# Patient Record
Sex: FEMALE | Race: BLACK OR AFRICAN AMERICAN | NOT HISPANIC OR LATINO | Employment: UNEMPLOYED | URBAN - METROPOLITAN AREA
[De-identification: names, ages, dates, MRNs, and addresses within clinical notes are randomized per-mention and may not be internally consistent; named-entity substitution may affect disease eponyms.]

---

## 2021-05-10 ENCOUNTER — OFFICE VISIT (OUTPATIENT)
Dept: OPHTHALMOLOGY | Facility: CLINIC | Age: 66
End: 2021-05-10
Payer: MEDICAID

## 2021-05-10 DIAGNOSIS — E11.9 TYPE 2 DIABETES MELLITUS WITHOUT RETINOPATHY: Primary | ICD-10-CM

## 2021-05-10 DIAGNOSIS — H40.013 OPEN ANGLE WITH BORDERLINE FINDINGS OF BOTH EYES: ICD-10-CM

## 2021-05-10 DIAGNOSIS — H25.011 CORTICAL AGE-RELATED CATARACT OF RIGHT EYE: ICD-10-CM

## 2021-05-10 DIAGNOSIS — H52.13 MYOPIA, BILATERAL: ICD-10-CM

## 2021-05-10 DIAGNOSIS — H25.13 NUCLEAR SCLEROSIS, BILATERAL: ICD-10-CM

## 2021-05-10 DIAGNOSIS — H52.222 REGULAR ASTIGMATISM OF LEFT EYE: ICD-10-CM

## 2021-05-10 DIAGNOSIS — H52.4 PRESBYOPIA: ICD-10-CM

## 2021-05-10 PROCEDURE — 92004 PR EYE EXAM, NEW PATIENT,COMPREHESV: ICD-10-PCS | Mod: S$PBB,,, | Performed by: OPTOMETRIST

## 2021-05-10 PROCEDURE — 92015 PR REFRACTION: ICD-10-PCS | Mod: ,,, | Performed by: OPTOMETRIST

## 2021-05-10 PROCEDURE — 92133 POSTERIOR SEGMENT OCT OPTIC NERVE(OCULAR COHERENCE TOMOGRAPHY) - OU - BOTH EYES: ICD-10-PCS | Mod: 26,S$PBB,, | Performed by: OPTOMETRIST

## 2021-05-10 PROCEDURE — 92133 CPTRZD OPH DX IMG PST SGM ON: CPT | Mod: PBBFAC | Performed by: OPTOMETRIST

## 2021-05-10 PROCEDURE — 99999 PR PBB SHADOW E&M-NEW PATIENT-LVL II: CPT | Mod: PBBFAC,,, | Performed by: OPTOMETRIST

## 2021-05-10 PROCEDURE — 99202 OFFICE O/P NEW SF 15 MIN: CPT | Mod: PBBFAC,25 | Performed by: OPTOMETRIST

## 2021-05-10 PROCEDURE — 92004 COMPRE OPH EXAM NEW PT 1/>: CPT | Mod: S$PBB,,, | Performed by: OPTOMETRIST

## 2021-05-10 PROCEDURE — 99999 PR PBB SHADOW E&M-NEW PATIENT-LVL II: ICD-10-PCS | Mod: PBBFAC,,, | Performed by: OPTOMETRIST

## 2021-05-10 PROCEDURE — 92015 DETERMINE REFRACTIVE STATE: CPT | Mod: ,,, | Performed by: OPTOMETRIST

## 2021-05-10 RX ORDER — METFORMIN HYDROCHLORIDE 500 MG/1
500 TABLET, EXTENDED RELEASE ORAL 2 TIMES DAILY
COMMUNITY
Start: 2021-03-26

## 2021-05-10 RX ORDER — LORATADINE 10 MG/1
10 TABLET ORAL DAILY
COMMUNITY

## 2021-05-10 RX ORDER — FLUTICASONE PROPIONATE 50 MCG
1 SPRAY, SUSPENSION (ML) NASAL DAILY
COMMUNITY
Start: 2021-05-01

## 2021-05-10 RX ORDER — AMLODIPINE BESYLATE 5 MG/1
5 TABLET ORAL DAILY
COMMUNITY
Start: 2021-03-03

## 2021-05-10 RX ORDER — LETROZOLE 2.5 MG/1
2.5 TABLET, FILM COATED ORAL DAILY
COMMUNITY
Start: 2021-04-30

## 2021-05-10 RX ORDER — MELOXICAM 15 MG/1
15 TABLET ORAL DAILY
COMMUNITY
Start: 2021-04-07

## 2021-08-13 ENCOUNTER — APPOINTMENT (OUTPATIENT)
Dept: OPHTHALMOLOGY | Facility: CLINIC | Age: 66
End: 2021-08-13
Payer: MEDICAID

## 2021-08-13 ENCOUNTER — OFFICE VISIT (OUTPATIENT)
Dept: OPHTHALMOLOGY | Facility: CLINIC | Age: 66
End: 2021-08-13
Payer: MEDICARE

## 2021-08-13 DIAGNOSIS — H40.013 OPEN ANGLE WITH BORDERLINE FINDINGS OF BOTH EYES: Primary | ICD-10-CM

## 2021-08-13 PROCEDURE — 92012 INTRM OPH EXAM EST PATIENT: CPT | Mod: S$PBB,,, | Performed by: OPTOMETRIST

## 2021-08-13 PROCEDURE — 76514 ECHO EXAM OF EYE THICKNESS: CPT | Mod: 26,S$PBB,, | Performed by: OPTOMETRIST

## 2021-08-13 PROCEDURE — 92083 HUMPHREY VISUAL FIELD - OU - BOTH EYES: ICD-10-PCS | Mod: 26,S$PBB,, | Performed by: OPTOMETRIST

## 2021-08-13 PROCEDURE — 76514 ECHO EXAM OF EYE THICKNESS: CPT | Mod: PBBFAC | Performed by: OPTOMETRIST

## 2021-08-13 PROCEDURE — 76514 PR  US, EYE, FOR CORNEAL THICKNESS: ICD-10-PCS | Mod: 26,S$PBB,, | Performed by: OPTOMETRIST

## 2021-08-13 PROCEDURE — 99999 PR PBB SHADOW E&M-EST. PATIENT-LVL II: ICD-10-PCS | Mod: PBBFAC,,, | Performed by: OPTOMETRIST

## 2021-08-13 PROCEDURE — 92012 PR EYE EXAM, EST PATIENT,INTERMED: ICD-10-PCS | Mod: S$PBB,,, | Performed by: OPTOMETRIST

## 2021-08-13 PROCEDURE — 99212 OFFICE O/P EST SF 10 MIN: CPT | Mod: PBBFAC | Performed by: OPTOMETRIST

## 2021-08-13 PROCEDURE — 99999 PR PBB SHADOW E&M-EST. PATIENT-LVL II: CPT | Mod: PBBFAC,,, | Performed by: OPTOMETRIST

## 2021-08-13 PROCEDURE — 92083 EXTENDED VISUAL FIELD XM: CPT | Mod: PBBFAC | Performed by: OPTOMETRIST

## 2021-12-14 ENCOUNTER — OFFICE VISIT (OUTPATIENT)
Dept: OPHTHALMOLOGY | Facility: CLINIC | Age: 66
End: 2021-12-14
Payer: MEDICARE

## 2021-12-14 DIAGNOSIS — H40.013 OPEN ANGLE WITH BORDERLINE FINDINGS OF BOTH EYES: Primary | ICD-10-CM

## 2021-12-14 PROCEDURE — 99999 PR PBB SHADOW E&M-EST. PATIENT-LVL II: ICD-10-PCS | Mod: PBBFAC,,, | Performed by: OPTOMETRIST

## 2021-12-14 PROCEDURE — 99212 OFFICE O/P EST SF 10 MIN: CPT | Mod: PBBFAC | Performed by: OPTOMETRIST

## 2021-12-14 PROCEDURE — 99213 OFFICE O/P EST LOW 20 MIN: CPT | Mod: S$GLB,,, | Performed by: OPTOMETRIST

## 2021-12-14 PROCEDURE — 99999 PR PBB SHADOW E&M-EST. PATIENT-LVL II: CPT | Mod: PBBFAC,,, | Performed by: OPTOMETRIST

## 2021-12-14 PROCEDURE — 4010F ACE/ARB THERAPY RXD/TAKEN: CPT | Mod: CPTII,S$GLB,, | Performed by: OPTOMETRIST

## 2021-12-14 PROCEDURE — 99213 PR OFFICE/OUTPT VISIT, EST, LEVL III, 20-29 MIN: ICD-10-PCS | Mod: S$GLB,,, | Performed by: OPTOMETRIST

## 2021-12-14 PROCEDURE — 4010F PR ACE/ARB THEARPY RXD/TAKEN: ICD-10-PCS | Mod: CPTII,S$GLB,, | Performed by: OPTOMETRIST

## 2021-12-27 ENCOUNTER — TELEPHONE (OUTPATIENT)
Dept: OPHTHALMOLOGY | Facility: CLINIC | Age: 66
End: 2021-12-27
Payer: MEDICARE

## 2022-03-31 ENCOUNTER — OFFICE VISIT (OUTPATIENT)
Dept: OPHTHALMOLOGY | Facility: CLINIC | Age: 67
End: 2022-03-31
Payer: MEDICARE

## 2022-03-31 DIAGNOSIS — H25.011 CORTICAL AGE-RELATED CATARACT OF RIGHT EYE: ICD-10-CM

## 2022-03-31 DIAGNOSIS — E11.36 DIABETIC CATARACT OF BOTH EYES: ICD-10-CM

## 2022-03-31 DIAGNOSIS — E11.9 TYPE 2 DIABETES MELLITUS WITHOUT RETINOPATHY: Primary | ICD-10-CM

## 2022-03-31 DIAGNOSIS — H52.222 REGULAR ASTIGMATISM OF LEFT EYE: ICD-10-CM

## 2022-03-31 DIAGNOSIS — H40.013 OPEN ANGLE WITH BORDERLINE FINDINGS OF BOTH EYES: ICD-10-CM

## 2022-03-31 DIAGNOSIS — H52.13 MYOPIA WITH PRESBYOPIA, BILATERAL: ICD-10-CM

## 2022-03-31 DIAGNOSIS — H25.13 NUCLEAR SCLEROSIS, BILATERAL: ICD-10-CM

## 2022-03-31 DIAGNOSIS — H52.4 MYOPIA WITH PRESBYOPIA, BILATERAL: ICD-10-CM

## 2022-03-31 PROCEDURE — 92014 PR EYE EXAM, EST PATIENT,COMPREHESV: ICD-10-PCS | Mod: S$GLB,,, | Performed by: OPTOMETRIST

## 2022-03-31 PROCEDURE — 92015 DETERMINE REFRACTIVE STATE: CPT | Mod: S$GLB,,, | Performed by: OPTOMETRIST

## 2022-03-31 PROCEDURE — 1160F PR REVIEW ALL MEDS BY PRESCRIBER/CLIN PHARMACIST DOCUMENTED: ICD-10-PCS | Mod: CPTII,S$GLB,, | Performed by: OPTOMETRIST

## 2022-03-31 PROCEDURE — 1160F RVW MEDS BY RX/DR IN RCRD: CPT | Mod: CPTII,S$GLB,, | Performed by: OPTOMETRIST

## 2022-03-31 PROCEDURE — 99211 OFF/OP EST MAY X REQ PHY/QHP: CPT | Mod: PBBFAC | Performed by: OPTOMETRIST

## 2022-03-31 PROCEDURE — 92014 COMPRE OPH EXAM EST PT 1/>: CPT | Mod: S$GLB,,, | Performed by: OPTOMETRIST

## 2022-03-31 PROCEDURE — 2023F DILAT RTA XM W/O RTNOPTHY: CPT | Mod: CPTII,S$GLB,, | Performed by: OPTOMETRIST

## 2022-03-31 PROCEDURE — 92133 POSTERIOR SEGMENT OCT OPTIC NERVE(OCULAR COHERENCE TOMOGRAPHY) - OU - BOTH EYES: ICD-10-PCS | Mod: S$GLB,,, | Performed by: OPTOMETRIST

## 2022-03-31 PROCEDURE — 1159F MED LIST DOCD IN RCRD: CPT | Mod: CPTII,S$GLB,, | Performed by: OPTOMETRIST

## 2022-03-31 PROCEDURE — 99999 PR PBB SHADOW E&M-EST. PATIENT-LVL I: CPT | Mod: PBBFAC,,, | Performed by: OPTOMETRIST

## 2022-03-31 PROCEDURE — 92133 CPTRZD OPH DX IMG PST SGM ON: CPT | Mod: PBBFAC | Performed by: OPTOMETRIST

## 2022-03-31 PROCEDURE — 1159F PR MEDICATION LIST DOCUMENTED IN MEDICAL RECORD: ICD-10-PCS | Mod: CPTII,S$GLB,, | Performed by: OPTOMETRIST

## 2022-03-31 PROCEDURE — 2023F PR DILATED RETINAL EXAM W/O EVID OF RETINOPATHY: ICD-10-PCS | Mod: CPTII,S$GLB,, | Performed by: OPTOMETRIST

## 2022-03-31 PROCEDURE — 92015 PR REFRACTION: ICD-10-PCS | Mod: S$GLB,,, | Performed by: OPTOMETRIST

## 2022-03-31 PROCEDURE — 99999 PR PBB SHADOW E&M-EST. PATIENT-LVL I: ICD-10-PCS | Mod: PBBFAC,,, | Performed by: OPTOMETRIST

## 2022-03-31 NOTE — PROGRESS NOTES
HPI     Glaucoma Suspect     Comments: Medication eye drops: none  Last HVF: 8/31/21  Last gOCT: 3/31/22  Last SDP: none                 Last edited by Zelda Steward MA on 3/31/2022  8:07 AM. (History)            Assessment /Plan     For exam results, see Encounter Report.    Type 2 diabetes mellitus without retinopathy  There was no diabetic retinopathy present in either eye today.   Recommended that pt continue care with PCP and/or specialists regarding diabetes.  Follow-up dilated eye exam recommended in 12 months, sooner with any vision changes or new concerns.    Nuclear sclerosis, bilateral  Cortical age-related cataract of right eye  Diabetic cataract of both eyes  Cataracts not significantly affecting activities of daily living and therefore surgery is not indicated at this time.   Will continue to monitor over the next 12 months. Pt to call or RTC with any significant change in vision prior to next visit.     Open angle with borderline findings of both eyes  -     Posterior Segment OCT Optic Nerve- Both eyes  Stable IOP today OD, OS  Stable gOCT OU  No treatment required at this time  Monitor 6 months    Myopia with presbyopia, bilateral  Regular astigmatism of left eye  Eyeglass Final Rx     Eyeglass Final Rx       Sphere Cylinder Axis    Right -1.50      Left -0.50 +0.50 180    Expiration Date: 3/31/2023                RTC 6 months for 24-2VF and IOP check or PRN  Discussed above and all questions were answered.

## 2022-10-04 ENCOUNTER — OFFICE VISIT (OUTPATIENT)
Dept: OPHTHALMOLOGY | Facility: CLINIC | Age: 67
End: 2022-10-04
Payer: MEDICARE

## 2022-10-04 DIAGNOSIS — H40.013 OPEN ANGLE WITH BORDERLINE FINDINGS OF BOTH EYES: Primary | ICD-10-CM

## 2022-10-04 PROCEDURE — 92012 PR EYE EXAM, EST PATIENT,INTERMED: ICD-10-PCS | Mod: S$GLB,,, | Performed by: OPTOMETRIST

## 2022-10-04 PROCEDURE — 99999 PR PBB SHADOW E&M-EST. PATIENT-LVL II: ICD-10-PCS | Mod: PBBFAC,,, | Performed by: OPTOMETRIST

## 2022-10-04 PROCEDURE — 1159F PR MEDICATION LIST DOCUMENTED IN MEDICAL RECORD: ICD-10-PCS | Mod: CPTII,S$GLB,, | Performed by: OPTOMETRIST

## 2022-10-04 PROCEDURE — 99999 PR PBB SHADOW E&M-EST. PATIENT-LVL II: CPT | Mod: PBBFAC,,, | Performed by: OPTOMETRIST

## 2022-10-04 PROCEDURE — 92012 INTRM OPH EXAM EST PATIENT: CPT | Mod: S$GLB,,, | Performed by: OPTOMETRIST

## 2022-10-04 PROCEDURE — 92083 EXTENDED VISUAL FIELD XM: CPT | Mod: S$GLB,,, | Performed by: OPTOMETRIST

## 2022-10-04 PROCEDURE — 1160F PR REVIEW ALL MEDS BY PRESCRIBER/CLIN PHARMACIST DOCUMENTED: ICD-10-PCS | Mod: CPTII,S$GLB,, | Performed by: OPTOMETRIST

## 2022-10-04 PROCEDURE — 1159F MED LIST DOCD IN RCRD: CPT | Mod: CPTII,S$GLB,, | Performed by: OPTOMETRIST

## 2022-10-04 PROCEDURE — 1160F RVW MEDS BY RX/DR IN RCRD: CPT | Mod: CPTII,S$GLB,, | Performed by: OPTOMETRIST

## 2022-10-04 PROCEDURE — 92083 HUMPHREY VISUAL FIELD - OU - BOTH EYES: ICD-10-PCS | Mod: S$GLB,,, | Performed by: OPTOMETRIST

## 2022-10-04 NOTE — PROGRESS NOTES
HPI     Glaucoma Suspect            Comments: Here for HVF review and IOP check          Last edited by Gopal Jules on 10/4/2022  8:41 AM.            Assessment /Plan     For exam results, see Encounter Report.    Open angle with borderline findings of both eyes  -     Benítez Visual Field - OU - Extended - Both Eyes    IOP borderline elevated today OU  Normal VF today OD, OS  Will consider tx at next visit with any progression on OCT or higher IOP  Monitor 6 months      RTC 6 months for dilated eye exam and gOCT or PRN if any problems.   Discussed above and answered questions.

## 2023-04-17 ENCOUNTER — OFFICE VISIT (OUTPATIENT)
Dept: OPHTHALMOLOGY | Facility: CLINIC | Age: 68
End: 2023-04-17
Payer: MEDICARE

## 2023-04-17 DIAGNOSIS — H52.4 MYOPIA WITH PRESBYOPIA, BILATERAL: ICD-10-CM

## 2023-04-17 DIAGNOSIS — H52.13 MYOPIA WITH PRESBYOPIA, BILATERAL: ICD-10-CM

## 2023-04-17 DIAGNOSIS — H25.13 NUCLEAR SCLEROSIS, BILATERAL: ICD-10-CM

## 2023-04-17 DIAGNOSIS — H52.221 REGULAR ASTIGMATISM OF RIGHT EYE: ICD-10-CM

## 2023-04-17 DIAGNOSIS — H40.013 OPEN ANGLE WITH BORDERLINE FINDINGS OF BOTH EYES: ICD-10-CM

## 2023-04-17 DIAGNOSIS — E11.36 DIABETIC CATARACT OF BOTH EYES: ICD-10-CM

## 2023-04-17 DIAGNOSIS — H25.011 CORTICAL AGE-RELATED CATARACT OF RIGHT EYE: ICD-10-CM

## 2023-04-17 DIAGNOSIS — E11.9 TYPE 2 DIABETES MELLITUS WITHOUT RETINOPATHY: Primary | ICD-10-CM

## 2023-04-17 PROCEDURE — 92133 CPTRZD OPH DX IMG PST SGM ON: CPT | Mod: S$GLB,,, | Performed by: OPTOMETRIST

## 2023-04-17 PROCEDURE — 92014 PR EYE EXAM, EST PATIENT,COMPREHESV: ICD-10-PCS | Mod: S$GLB,,, | Performed by: OPTOMETRIST

## 2023-04-17 PROCEDURE — 1160F PR REVIEW ALL MEDS BY PRESCRIBER/CLIN PHARMACIST DOCUMENTED: ICD-10-PCS | Mod: CPTII,S$GLB,, | Performed by: OPTOMETRIST

## 2023-04-17 PROCEDURE — 4010F ACE/ARB THERAPY RXD/TAKEN: CPT | Mod: CPTII,S$GLB,, | Performed by: OPTOMETRIST

## 2023-04-17 PROCEDURE — 92015 PR REFRACTION: ICD-10-PCS | Mod: S$GLB,,, | Performed by: OPTOMETRIST

## 2023-04-17 PROCEDURE — 1159F MED LIST DOCD IN RCRD: CPT | Mod: CPTII,S$GLB,, | Performed by: OPTOMETRIST

## 2023-04-17 PROCEDURE — 92133 POSTERIOR SEGMENT OCT OPTIC NERVE(OCULAR COHERENCE TOMOGRAPHY) - OU - BOTH EYES: ICD-10-PCS | Mod: S$GLB,,, | Performed by: OPTOMETRIST

## 2023-04-17 PROCEDURE — 92015 DETERMINE REFRACTIVE STATE: CPT | Mod: S$GLB,,, | Performed by: OPTOMETRIST

## 2023-04-17 PROCEDURE — 2023F DILAT RTA XM W/O RTNOPTHY: CPT | Mod: CPTII,S$GLB,, | Performed by: OPTOMETRIST

## 2023-04-17 PROCEDURE — 1159F PR MEDICATION LIST DOCUMENTED IN MEDICAL RECORD: ICD-10-PCS | Mod: CPTII,S$GLB,, | Performed by: OPTOMETRIST

## 2023-04-17 PROCEDURE — 2023F PR DILATED RETINAL EXAM W/O EVID OF RETINOPATHY: ICD-10-PCS | Mod: CPTII,S$GLB,, | Performed by: OPTOMETRIST

## 2023-04-17 PROCEDURE — 92014 COMPRE OPH EXAM EST PT 1/>: CPT | Mod: S$GLB,,, | Performed by: OPTOMETRIST

## 2023-04-17 PROCEDURE — 1160F RVW MEDS BY RX/DR IN RCRD: CPT | Mod: CPTII,S$GLB,, | Performed by: OPTOMETRIST

## 2023-04-17 PROCEDURE — 99999 PR PBB SHADOW E&M-EST. PATIENT-LVL II: ICD-10-PCS | Mod: PBBFAC,,, | Performed by: OPTOMETRIST

## 2023-04-17 PROCEDURE — 99999 PR PBB SHADOW E&M-EST. PATIENT-LVL II: CPT | Mod: PBBFAC,,, | Performed by: OPTOMETRIST

## 2023-04-17 PROCEDURE — 4010F PR ACE/ARB THEARPY RXD/TAKEN: ICD-10-PCS | Mod: CPTII,S$GLB,, | Performed by: OPTOMETRIST

## 2023-04-17 NOTE — PROGRESS NOTES
HPI     Glaucoma            Comments: Medication eye drops: none  Last HVF: 10/04/22  Last gOCT: 4/17/23  Last SDP: none    Vision changes since last eye exam?: no  Any eye pain today: today from sinus issues   Other ocular symptoms: no  Interested in contact lens fitting today? no  Pt states eyes are watery          Last edited by Zelda Steward MA on 4/17/2023  1:16 PM.            Assessment /Plan     For exam results, see Encounter Report.    Type 2 diabetes mellitus without retinopathy  There was no diabetic retinopathy present in either eye today.   Recommended that pt continue care with PCP and/or specialists regarding diabetes.  Follow-up dilated eye exam recommended in 12 months, sooner with any vision changes or new concerns.    Open angle with borderline findings of both eyes  -     Posterior Segment OCT Optic Nerve- Both eyes  IOP stable today OU  No changes on gOCT compared to previous scans with stable, symmetric GCL OU  Discussed SLT vs drops in the future if needed  Monitor 12 months    Nuclear sclerosis, bilateral  Cortical age-related cataract of right eye  Diabetic cataract of both eyes  Cataracts not significantly affecting activities of daily living and therefore surgery is not indicated at this time.   Will continue to monitor over the next 12 months. Pt to call or RTC with any significant change in vision prior to next visit.     Myopia with presbyopia, bilateral  Regular astigmatism of right eye  Eyeglass Final Rx       Eyeglass Final Rx         Sphere Cylinder Axis    Right -1.75 +0.75 020    Left -0.75        Type: SVL distance    Expiration Date: 4/17/2024                    RTC 1 yr for 24-2VF, gOCT and dilated eye exam and or PRN if any problems.   Discussed above and answered questions.

## 2024-03-20 ENCOUNTER — TELEPHONE (OUTPATIENT)
Dept: OPHTHALMOLOGY | Facility: CLINIC | Age: 69
End: 2024-03-20
Payer: MEDICARE

## 2024-03-20 NOTE — TELEPHONE ENCOUNTER
----- Message from Margarita Almendarez sent at 3/20/2024 10:47 AM CDT -----  Contact: Patient  Patient is requesting that her eye script be mailed to her home. Please call patient at .[phone]

## 2024-08-09 ENCOUNTER — OFFICE VISIT (OUTPATIENT)
Dept: OPHTHALMOLOGY | Facility: CLINIC | Age: 69
End: 2024-08-09
Payer: MEDICARE

## 2024-08-09 DIAGNOSIS — E11.36 DIABETIC CATARACT OF BOTH EYES: ICD-10-CM

## 2024-08-09 DIAGNOSIS — H52.221 REGULAR ASTIGMATISM OF RIGHT EYE: ICD-10-CM

## 2024-08-09 DIAGNOSIS — H25.13 NUCLEAR SCLEROSIS, BILATERAL: ICD-10-CM

## 2024-08-09 DIAGNOSIS — E11.9 TYPE 2 DIABETES MELLITUS WITHOUT RETINOPATHY: Primary | ICD-10-CM

## 2024-08-09 DIAGNOSIS — H40.1134 PRIMARY OPEN ANGLE GLAUCOMA (POAG) OF BOTH EYES, INDETERMINATE STAGE: ICD-10-CM

## 2024-08-09 DIAGNOSIS — H52.13 MYOPIA WITH PRESBYOPIA, BILATERAL: ICD-10-CM

## 2024-08-09 DIAGNOSIS — H52.4 MYOPIA WITH PRESBYOPIA, BILATERAL: ICD-10-CM

## 2024-08-09 DIAGNOSIS — H25.011 CORTICAL AGE-RELATED CATARACT OF RIGHT EYE: ICD-10-CM

## 2024-08-09 PROCEDURE — 99999 PR PBB SHADOW E&M-EST. PATIENT-LVL II: CPT | Mod: PBBFAC,,, | Performed by: OPTOMETRIST

## 2024-08-09 RX ORDER — LATANOPROST 50 UG/ML
1 SOLUTION/ DROPS OPHTHALMIC NIGHTLY
Qty: 2.5 ML | Refills: 11 | Status: SHIPPED | OUTPATIENT
Start: 2024-08-09

## 2024-09-04 ENCOUNTER — TELEPHONE (OUTPATIENT)
Dept: OPHTHALMOLOGY | Facility: CLINIC | Age: 69
End: 2024-09-04
Payer: MEDICARE

## 2024-09-04 RX ORDER — LATANOPROST 50 UG/ML
1 SOLUTION/ DROPS OPHTHALMIC NIGHTLY
Qty: 2.5 ML | Refills: 11 | Status: CANCELLED | OUTPATIENT
Start: 2024-09-04

## 2024-09-04 NOTE — TELEPHONE ENCOUNTER
----- Message from Emanuel Tidwell sent at 9/4/2024  9:21 AM CDT -----  Regarding: meds  Name of Who is Calling:Pt         What is the request in detail: Requesting callback in regards to refill on eye drops. Pt also requesting if provider can send script to mail order instead of [facility]           Can the clinic reply by [facility]: no         What Number to Call Back if not in [facility]:Telephone Information:  Mobile          [phone]

## 2024-09-04 NOTE — TELEPHONE ENCOUNTER
----- Message from Emanuel Tidwell sent at 9/4/2024  9:21 AM CDT -----  Regarding: meds  Name of Who is Calling:Pt         What is the request in detail: Requesting callback in regards to refill on eye drops. Pt also requesting if provider can send script to mail order instead of [facility]           Can the clinic reply by [facility]: no         What Number to Call Back if not in [facility]:Telephone Information:  Mobile          [phone]

## 2024-09-05 RX ORDER — LATANOPROST 50 UG/ML
1 SOLUTION/ DROPS OPHTHALMIC NIGHTLY
Qty: 2.5 ML | Refills: 11 | Status: SHIPPED | OUTPATIENT
Start: 2024-09-05

## 2024-09-18 ENCOUNTER — TELEPHONE (OUTPATIENT)
Dept: OPHTHALMOLOGY | Facility: CLINIC | Age: 69
End: 2024-09-18
Payer: MEDICARE

## 2024-09-18 NOTE — TELEPHONE ENCOUNTER
Spoke with patient to tell her I am giving her request to our schedulers in the front to take care of her

## 2024-09-18 NOTE — TELEPHONE ENCOUNTER
"----- Message from Preston Almazan sent at 9/18/2024 12:57 PM CDT -----  Reschedule Existing Appointment    Appt Date: 9/19    Type of appt: ED F/u - 1m IOP ck    Physician: Massiel    Reason for rescheduling?  Pt will be out of town; Requesting to r/s for within 1-2 weeks    Caller: Self    Contact Preference:  [phone]      Additional Information:  "Thank you for all that you do for our patients"  "

## 2024-09-24 ENCOUNTER — OFFICE VISIT (OUTPATIENT)
Dept: OPHTHALMOLOGY | Facility: CLINIC | Age: 69
End: 2024-09-24
Payer: MEDICARE

## 2024-09-24 DIAGNOSIS — H40.1134 PRIMARY OPEN ANGLE GLAUCOMA (POAG) OF BOTH EYES, INDETERMINATE STAGE: Primary | ICD-10-CM

## 2024-09-24 PROCEDURE — 1160F RVW MEDS BY RX/DR IN RCRD: CPT | Mod: CPTII,S$GLB,, | Performed by: OPTOMETRIST

## 2024-09-24 PROCEDURE — 1159F MED LIST DOCD IN RCRD: CPT | Mod: CPTII,S$GLB,, | Performed by: OPTOMETRIST

## 2024-09-24 PROCEDURE — 1126F AMNT PAIN NOTED NONE PRSNT: CPT | Mod: CPTII,S$GLB,, | Performed by: OPTOMETRIST

## 2024-09-24 PROCEDURE — 4010F ACE/ARB THERAPY RXD/TAKEN: CPT | Mod: CPTII,S$GLB,, | Performed by: OPTOMETRIST

## 2024-09-24 PROCEDURE — 3052F HG A1C>EQUAL 8.0%<EQUAL 9.0%: CPT | Mod: CPTII,S$GLB,, | Performed by: OPTOMETRIST

## 2024-09-24 PROCEDURE — 99213 OFFICE O/P EST LOW 20 MIN: CPT | Mod: S$GLB,,, | Performed by: OPTOMETRIST

## 2024-09-24 PROCEDURE — 99999 PR PBB SHADOW E&M-EST. PATIENT-LVL II: CPT | Mod: PBBFAC,,, | Performed by: OPTOMETRIST

## 2024-09-24 NOTE — PROGRESS NOTES
HPI     Glaucoma            Comments: Medication eye drops: Latanoporst qhs OU  Last HVF: 8/09/2024  Last gOCT: 8/09/2024  Last SDP: none     Pt states compliance with Latanoprost qhs OU           Last edited by Zelda Steward MA on 9/24/2024 11:21 AM.            Assessment /Plan     For exam results, see Encounter Report.    Primary open angle glaucoma (POAG) of both eyes, indeterminate stage    IOP stable today and within acceptable range relative to target OU  Continue care with PCP for sinus congestion  Discussed ENT consult in the future if needed  Continue current treatment  Monitor 4 months    Continue Latanoprost qhs OU      RTC 4 months for IOP check with [facility] or PRN  Discussed above and all questions were answered.

## 2024-11-20 RX ORDER — LATANOPROST 50 UG/ML
1 SOLUTION/ DROPS OPHTHALMIC NIGHTLY
Qty: 7.5 ML | Refills: 4 | Status: SHIPPED | OUTPATIENT
Start: 2024-11-20

## 2024-11-20 NOTE — TELEPHONE ENCOUNTER
----- Message from Lin sent at 11/20/2024  9:24 AM CST -----  Contact: Gabrielle Anthony is calling to speak to the nurse regarding a refill request on the eye drops latanoprost 0.005 % ophthalmic solution, the patient is requesting to use the same pharmacy, if any questions please give her a call at      Thanks  LJ

## 2025-02-20 ENCOUNTER — TELEPHONE (OUTPATIENT)
Dept: OPHTHALMOLOGY | Facility: CLINIC | Age: 70
End: 2025-02-20
Payer: MEDICARE

## 2025-02-20 NOTE — TELEPHONE ENCOUNTER
Spoke w/ pt and found appt time 3/06/25 @10:30-- giving info to Jeniffer to schedule for us.     ----- Message from Aixa sent at 2/20/2025 10:57 AM CST -----  Contact: [phone]  [email or web address] Patient is calling for Medical Advice regarding:insurance starts after March 1 st How long has patient had these symptoms:asking for an appt in March after 10 AM Pharmacy name and phone#:Patient wants a call back or thru [facility]:call back Comments:Please advise Please advise patient replies from provider may take up to 48 hours.

## 2025-03-06 ENCOUNTER — OFFICE VISIT (OUTPATIENT)
Dept: OPHTHALMOLOGY | Facility: CLINIC | Age: 70
End: 2025-03-06
Payer: MEDICARE

## 2025-03-06 DIAGNOSIS — E11.36 DIABETIC CATARACT OF BOTH EYES: ICD-10-CM

## 2025-03-06 DIAGNOSIS — H25.011 CORTICAL AGE-RELATED CATARACT OF RIGHT EYE: ICD-10-CM

## 2025-03-06 DIAGNOSIS — H40.1134 PRIMARY OPEN ANGLE GLAUCOMA (POAG) OF BOTH EYES, INDETERMINATE STAGE: Primary | ICD-10-CM

## 2025-03-06 DIAGNOSIS — H25.13 NUCLEAR SCLEROSIS, BILATERAL: ICD-10-CM

## 2025-03-06 PROCEDURE — 99999 PR PBB SHADOW E&M-EST. PATIENT-LVL II: CPT | Mod: PBBFAC,,, | Performed by: OPTOMETRIST

## 2025-03-06 PROCEDURE — 1159F MED LIST DOCD IN RCRD: CPT | Mod: CPTII,S$GLB,, | Performed by: OPTOMETRIST

## 2025-03-06 PROCEDURE — G2211 COMPLEX E/M VISIT ADD ON: HCPCS | Mod: S$GLB,,, | Performed by: OPTOMETRIST

## 2025-03-06 PROCEDURE — 99214 OFFICE O/P EST MOD 30 MIN: CPT | Mod: S$GLB,,, | Performed by: OPTOMETRIST

## 2025-03-06 PROCEDURE — 3044F HG A1C LEVEL LT 7.0%: CPT | Mod: CPTII,S$GLB,, | Performed by: OPTOMETRIST

## 2025-03-06 PROCEDURE — 1160F RVW MEDS BY RX/DR IN RCRD: CPT | Mod: CPTII,S$GLB,, | Performed by: OPTOMETRIST

## 2025-03-06 NOTE — PROGRESS NOTES
HPI     Glaucoma            Comments: Pt presents for a 4 month IOP check w/ [facility].   Last HVF: 08/09/24  Last gOCT: 08/09/24  Last DFE: 04/17/23-- skipped last DFE due to elevated IOP.     Pt states she is compliant w/ gtts.   No other concerns at this time.           Comments    1. DM  2. POAG Mild OU   3. FmHx of Glaucoma  4. NS OU  Flonase    Target 22 OU     Latanoprost qhs OU (started 8/9/24)           Last edited by Anayeli Alvarenga on 3/6/2025 10:39 AM.            Assessment /Plan     For exam results, see Encounter Report.    Primary open angle glaucoma (POAG) of both eyes, indeterminate stage  IOP stable today and within acceptable range relative to target OU  Continue current treatment  Monitor 4 months    Continue Latanoprost qhs OU    Nuclear sclerosis, bilateral  Cortical age-related cataract of right eye  Diabetic cataract of both eyes  Condition stable, no therapeutic change required.   Monitoring routinely.       RTC 4 months for 24-2VF, gOCT and dilated exam or PRN  Discussed above and all questions were answered.